# Patient Record
(demographics unavailable — no encounter records)

---

## 2025-01-30 NOTE — HISTORY OF PRESENT ILLNESS
[FreeTextEntry1] : Madhuri Hedrick is a very pleasant 61-year-old female who presents to discuss surveillance colonoscopy.  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  Post procedure the patient has done well.  She has had no significant medical events over the past year.  She denies visible regular bleeding with bowel movements.,  Change in her bowel habits or pattern or abdominal pain.  And again she denies a family history of first-degree relatives with colorectal cancer or advanced colonic polyp pathology.  I discussed with Madhuri the risks and benefits and logistic procedure of repeat colonoscopy for a diagnosis of history of colon polyps.  We are moving forward with surgical scheduling.

## 2025-01-30 NOTE — CONSULT LETTER
[Sincerely,] : Sincerely, [FreeTextEntry2] : Gallo Pride MD [FreeTextEntry1] :   I am moving forward with repeat colonoscopy for Mrs. Hedrick.  I will return to you with the results of examination.  As always many thanks [FreeTextEntry3] : Gerald Ramírez MD [facility]

## 2025-01-30 NOTE — PROCEDURE
[FreeTextEntry1] : I had the opportunity to discuss the risks, the benefits of colonoscopy and surveillance colonoscopy for her history of advanced rectal adenoma from one year prior.. Current recommendations for age at which to initiate screening colonoscopy is 45 for both men and women.  There may also be other reasons to undergo a colonoscopic evaluation, when evaluating symptoms such as visible bleeding, anemia, chronic recurrent abdominal pain, change in bowel habits or bowel caliber to name a few. Additionally, if the patient has a family history of a first-degree relative with colorectal cancer or with a history of advanced adenoma, then they should consider undergoing colonoscopic evaluation themselves beginning at either age 40, or 10 years prior to the age of their first-degree afflicted relative, whichever is earlier.  I discussed with the patient the laxative bowel preparation program necessary the day before. We reviewed the patient's medications in order to determine whether any of the medications need alteration such as blood thinners or diabetic medications, etc. I discussed with the patient the procedure itself. He will have the assistance of an anesthesiologist providing a propofol based anesthetic. The patient will be gently asleep and will not experience pain or discomfort during the examination.  I discussed with the patient that if there is a need for biopsy or tissue removal such as polypectomy, that will be performed by myself as deemed safe and necessary. Any tissues removed will be sent to the pathology department for histologic evaluation.  I will be getting back to the patient following the completion of that histologic evaluation and my receipt of the record.  The patient will then be recovered in the post endoscopy procedure room. Usually within about an hour following completion of the procedure the patient is discharged home with an accompanying friend or family member.  Complications associated with colonoscopy include bleeding, especially following procedures, estimated to be approximately 1 event per 1500 performed procedures.  Another rare but serious complication involves "perforation" or a hole in the colon as the result of the procedure or the exam itself.  That risk runs about 1 into 3000 procedures.  The patient would receive instruction upon discharge to call me and/or present to their local emergency room if they felt that there abdominal pain was steadily worsening and severe or of concern.  Patient is also invited to call me for any issues of concern they wish to discuss at any time following the procedure. Other complications include infection, post polypectomy electrocoagulation syndrome, a reaction to the anesthetics, severe pain or cramping, change in the normal pattern of bowel movements which can take for some people several days to normalize.  All questions answered.  The patient expresses satisfaction with the conversation I had. We have had an opportunity to review their medications and medical history and to determine their fitness for the proposed procedure.  Coordination with the family physician to seek medical clearance as appropriate is sometimes requested as part of the procedure scheduling process.